# Patient Record
Sex: MALE | Race: WHITE | NOT HISPANIC OR LATINO | Employment: FULL TIME | URBAN - NONMETROPOLITAN AREA
[De-identification: names, ages, dates, MRNs, and addresses within clinical notes are randomized per-mention and may not be internally consistent; named-entity substitution may affect disease eponyms.]

---

## 2017-02-16 ENCOUNTER — OFFICE VISIT (OUTPATIENT)
Dept: PULMONOLOGY | Facility: CLINIC | Age: 27
End: 2017-02-16

## 2017-02-16 VITALS
DIASTOLIC BLOOD PRESSURE: 78 MMHG | HEART RATE: 65 BPM | OXYGEN SATURATION: 96 % | HEIGHT: 72 IN | RESPIRATION RATE: 18 BRPM | SYSTOLIC BLOOD PRESSURE: 126 MMHG | WEIGHT: 312 LBS | BODY MASS INDEX: 42.26 KG/M2

## 2017-02-16 DIAGNOSIS — J45.30 OCCUPATIONAL ASTHMA, MILD PERSISTENT, UNCOMPLICATED: ICD-10-CM

## 2017-02-16 DIAGNOSIS — R06.02 SHORTNESS OF BREATH: Primary | ICD-10-CM

## 2017-02-16 DIAGNOSIS — G47.33 OBSTRUCTIVE SLEEP APNEA: ICD-10-CM

## 2017-02-16 DIAGNOSIS — Z57.9 OCCUPATIONAL ASTHMA, MILD PERSISTENT, UNCOMPLICATED: ICD-10-CM

## 2017-02-16 PROCEDURE — 99244 OFF/OP CNSLTJ NEW/EST MOD 40: CPT | Performed by: INTERNAL MEDICINE

## 2017-02-16 PROCEDURE — 95012 NITRIC OXIDE EXP GAS DETER: CPT | Performed by: INTERNAL MEDICINE

## 2017-02-16 RX ORDER — FLUTICASONE PROPIONATE 44 MCG
AEROSOL WITH ADAPTER (GRAM) INHALATION
COMMUNITY
Start: 2017-01-05 | End: 2017-02-16

## 2017-02-16 RX ORDER — FEXOFENADINE HCL AND PSEUDOEPHEDRINE HCI 180; 240 MG/1; MG/1
1 TABLET, EXTENDED RELEASE ORAL DAILY
COMMUNITY
End: 2017-10-18

## 2017-02-16 RX ORDER — ALBUTEROL SULFATE 90 UG/1
2 AEROSOL, METERED RESPIRATORY (INHALATION) EVERY 4 HOURS PRN
Qty: 1 INHALER | Refills: 5 | Status: SHIPPED | OUTPATIENT
Start: 2017-02-16 | End: 2017-02-24

## 2017-02-16 SDOH — HEALTH STABILITY - PHYSICAL HEALTH: OCCUPATIONAL EXPOSURE TO UNSPECIFIED RISK FACTOR: Z57.9

## 2017-02-16 NOTE — PROGRESS NOTES
"CONSULT NOTE    Chief Complaint   Patient presents with   • Cough     CHRONIC       Subjective   Kenny August is a 27 y.o. male.     History of Present Illness   Patient comes in today for consultation because of shortness of breath.  The patient says that since he started his new job, 1 year ago, he has noticed that he gets short of breath and starts wheezing, especially 2-3 days after he starts working again.  The patient actually reports improvement in symptoms when he was off work for more than 3-4 days at a time.    He also notes occasional wheezing, which is more pronounced when he is at work?    The patient denies any known family history of asthma although his grandfather had lung cancer.  He also grew up in a household where his grandfather smoked heavily inside the house    The patient was diagnosed with obstructive sleep apnea with an AHI of 17/h and currently uses CPAP device at a pressure of 12 to good effect.    The following portions of the patient's history were reviewed and updated as appropriate: allergies, current medications, past family history, past medical history, past social history and past surgical history.    Review of Systems   HENT: Positive for sinus pressure.    Respiratory: Positive for cough, chest tightness and shortness of breath.    All other systems reviewed and are negative.      Past Medical History   Diagnosis Date   • Sleep apnea, obstructive        Social History   Substance Use Topics   • Smoking status: Never Smoker   • Smokeless tobacco: Not on file   • Alcohol use No         Objective   Visit Vitals   • /78   • Pulse 65   • Resp 18   • Ht 72\" (182.9 cm)   • Wt (!) 312 lb (142 kg)   • SpO2 96%   • BMI 42.31 kg/m2     Physical Exam    Constitutional:           General: No acute distress noted. Able to communicate appropriately           Body Habitus: Obese.    Head/Face/Eyes:            Atraumatic. Normocephalic. No significant facial abnormalities seen.            " Extra ocular movement was intact.            Pupils appeared equal            Conjunctivae: Normal     ENT:           Nasal passages were patent.           Mallempati III/IV. Crowded oropharynx.            Tonsils not enlarged.    Neck:           Increased adipose tissue noted. No Jugular Venous Distention appreciated.     Cardiovascular:            S1 + S2. Regular.    Respiratory:           Respiratory effort was adequate           No crackles or wheezing was auscultated.    Musculoskeletal/Extremities:             Normal Gait.              No clubbing, cyanosis noted in the upper extremities.             No edema noted in the lower extremities bilaterally.    Neurologic/Psychiatric:             Affect appeared fair.                Awake, alert and oriented x 3.             Able to follow simple commands.    Skin:             No obvious rash noted.             Warm and dry.      Assessment/Plan   Kenny was seen today for cough.    Diagnoses and all orders for this visit:    Shortness of breath  -     Nitric Oxide    Occupational asthma, mild persistent, uncomplicated  -     Nitric Oxide    Obstructive sleep apnea    Other orders  -     mometasone-formoterol (DULERA 200) 200-5 MCG/ACT inhaler; Inhale 2 puffs 2 (Two) Times a Day. Rinse mouth with water after use.  -     albuterol (VENTOLIN HFA) 108 (90 BASE) MCG/ACT inhaler; Inhale 2 puffs Every 4 (Four) Hours As Needed for wheezing or shortness of air.           Return in about 2 months (around 4/16/2017) for Recheck.    DISCUSSION(if any):  I have reviewed the patient's imaging studies and shared them with him.  The CT scan does not show any acute intrathoracic abnormality.     I have also reviewed his last primary care provider's office note that mentions chronic cough and referral to pulmonology.  It also mentions suggestion of mild restriction on the PFTs.     ==============================  ==============================    I had a very long discussion with  the patient and told him that he clearly has symptoms of occupational asthma.  Since he seems to have at least mild to moderate persistent asthma at this time, I decided to start him on Dulera    Patient was advised to use albuterol based rescue inhaler for when necessary purposes    Patient was also advised to keep a log of the use of rescue inhaler.    FeNO levels were 13.    We will consider PFTs, upon follow-up visit mostly based on symptoms.      Errors in dictation may reflect use of voice recognition software and not all errors in transcription may have been detected prior to signing    This document was electronically signed by Sho Sandhu MD February 16, 2017  10:06 AM

## 2017-04-17 ENCOUNTER — OFFICE VISIT (OUTPATIENT)
Dept: PULMONOLOGY | Facility: CLINIC | Age: 27
End: 2017-04-17

## 2017-04-17 VITALS
WEIGHT: 308.6 LBS | SYSTOLIC BLOOD PRESSURE: 118 MMHG | HEIGHT: 72 IN | OXYGEN SATURATION: 98 % | RESPIRATION RATE: 16 BRPM | BODY MASS INDEX: 41.8 KG/M2 | HEART RATE: 75 BPM | DIASTOLIC BLOOD PRESSURE: 80 MMHG

## 2017-04-17 DIAGNOSIS — G47.33 OBSTRUCTIVE SLEEP APNEA: ICD-10-CM

## 2017-04-17 DIAGNOSIS — R06.02 SHORTNESS OF BREATH: Primary | ICD-10-CM

## 2017-04-17 DIAGNOSIS — Z57.9 OCCUPATIONAL ASTHMA, MILD PERSISTENT, UNCOMPLICATED: ICD-10-CM

## 2017-04-17 DIAGNOSIS — J45.30 OCCUPATIONAL ASTHMA, MILD PERSISTENT, UNCOMPLICATED: ICD-10-CM

## 2017-04-17 PROCEDURE — 99213 OFFICE O/P EST LOW 20 MIN: CPT | Performed by: INTERNAL MEDICINE

## 2017-04-17 RX ORDER — MONTELUKAST SODIUM 10 MG/1
10 TABLET ORAL NIGHTLY
Qty: 30 TABLET | Refills: 5 | Status: SHIPPED | OUTPATIENT
Start: 2017-04-17 | End: 2017-10-18 | Stop reason: SDUPTHER

## 2017-04-17 SDOH — HEALTH STABILITY - PHYSICAL HEALTH: OCCUPATIONAL EXPOSURE TO UNSPECIFIED RISK FACTOR: Z57.9

## 2017-04-17 NOTE — PROGRESS NOTES
"Chief Complaint   Patient presents with   • Follow-up         Subjective   Kenny August is a 27 y.o. male.     History of Present Illness   Comes in today to follow-up on asthma and shortness of breath    He feels that his symptoms have definitely improved since he started using Dulera.  He still requires pro-air once or twice a day, when he is working.    He feels that he does not need the pro-air at all, on the days when he is not at work.    The following portions of the patient's history were reviewed and updated as appropriate: allergies, current medications, past family history, past medical history, past social history and past surgical history.    Review of Systems   HENT: Positive for sinus pressure. Negative for sneezing and sore throat.    Respiratory: Positive for shortness of breath. Negative for cough and wheezing.        Objective   Visit Vitals   • /80   • Pulse 75   • Resp 16   • Ht 72\" (182.9 cm)   • Wt (!) 308 lb 9.6 oz (140 kg)   • SpO2 98%   • BMI 41.85 kg/m2       Physical Exam   Constitutional: He is oriented to person, place, and time. He appears well-developed and well-nourished.   HENT:   Head: Atraumatic.   Crowded oropharynx.   Pulmonary/Chest: Effort normal and breath sounds normal. He has no wheezes.   Musculoskeletal:   Gait normal.   Neurological: He is alert and oriented to person, place, and time.   Psychiatric: He has a normal mood and affect.   Vitals reviewed.      Assessment/Plan   Kenny was seen today for follow-up.    Diagnoses and all orders for this visit:    Shortness of breath    Occupational asthma, mild persistent, uncomplicated    Obstructive sleep apnea    Other orders  -     montelukast (SINGULAIR) 10 MG tablet; Take 1 tablet by mouth Every Night.  -     mometasone-formoterol (DULERA 200) 200-5 MCG/ACT inhaler; Inhale 2 puffs 2 (Two) Times a Day. Rinse mouth with water after use.         Return in about 6 months (around 10/17/2017) for Recheck.    DISCUSSION " (if any):  The patient continues to manifest symptoms of occupational asthma.  Since she had a very good response to Dulera, I have asked him to continue the same.    Singulair added.    He was advised to continue CPAP, which is being managed by his ENT provider    Errors in dictation may reflect use of voice recognition software and not all errors in transcription may have been detected prior to signing    This document was electronically signed by Sho Sandhu MD April 17, 2017  3:22 PM

## 2017-10-18 ENCOUNTER — OFFICE VISIT (OUTPATIENT)
Dept: PULMONOLOGY | Facility: CLINIC | Age: 27
End: 2017-10-18

## 2017-10-18 VITALS
WEIGHT: 278 LBS | BODY MASS INDEX: 37.65 KG/M2 | HEIGHT: 72 IN | SYSTOLIC BLOOD PRESSURE: 115 MMHG | HEART RATE: 75 BPM | OXYGEN SATURATION: 98 % | RESPIRATION RATE: 16 BRPM | DIASTOLIC BLOOD PRESSURE: 72 MMHG

## 2017-10-18 DIAGNOSIS — J45.40 MODERATE PERSISTENT ASTHMA WITHOUT COMPLICATION: ICD-10-CM

## 2017-10-18 DIAGNOSIS — J30.89 OTHER ALLERGIC RHINITIS: ICD-10-CM

## 2017-10-18 DIAGNOSIS — G47.33 OBSTRUCTIVE SLEEP APNEA: Primary | ICD-10-CM

## 2017-10-18 DIAGNOSIS — R06.02 SHORTNESS OF BREATH: ICD-10-CM

## 2017-10-18 PROCEDURE — 99214 OFFICE O/P EST MOD 30 MIN: CPT | Performed by: INTERNAL MEDICINE

## 2017-10-18 RX ORDER — PEN NEEDLE, DIABETIC 31 GX5/16"
NEEDLE, DISPOSABLE MISCELLANEOUS
COMMUNITY
Start: 2017-08-25 | End: 2019-01-24

## 2017-10-18 RX ORDER — MONTELUKAST SODIUM 10 MG/1
10 TABLET ORAL NIGHTLY
Qty: 30 TABLET | Refills: 5 | Status: SHIPPED | OUTPATIENT
Start: 2017-10-18 | End: 2018-01-25 | Stop reason: SDUPTHER

## 2017-10-18 RX ORDER — AZELASTINE 1 MG/ML
1 SPRAY, METERED NASAL EVERY 12 HOURS PRN
Qty: 1 EACH | Refills: 5 | Status: SHIPPED | OUTPATIENT
Start: 2017-10-18 | End: 2018-09-24 | Stop reason: SDUPTHER

## 2017-10-18 RX ORDER — FEXOFENADINE HCL 180 MG/1
TABLET ORAL
COMMUNITY
Start: 2017-10-16 | End: 2018-04-23

## 2017-10-18 RX ORDER — AZELASTINE 1 MG/ML
SPRAY, METERED NASAL
COMMUNITY
Start: 2017-09-25 | End: 2017-10-18 | Stop reason: SDUPTHER

## 2017-10-18 RX ORDER — LIRAGLUTIDE 6 MG/ML
INJECTION, SOLUTION SUBCUTANEOUS
COMMUNITY
Start: 2017-08-28 | End: 2017-10-18

## 2017-10-18 RX ORDER — FLUTICASONE PROPIONATE 50 MCG
SPRAY, SUSPENSION (ML) NASAL
COMMUNITY
Start: 2017-09-25 | End: 2018-09-24

## 2017-10-18 RX ORDER — CEFDINIR 300 MG/1
CAPSULE ORAL
COMMUNITY
Start: 2017-10-15 | End: 2019-01-24

## 2017-10-18 NOTE — PROGRESS NOTES
"Chief Complaint   Patient presents with   • Follow-up   • Shortness of Breath         Subjective   Kenny August is a 27 y.o. male.     History of Present Illness   Patient comes in today to follow-up on shortness of breath, asthma, allergic rhinitis and obstructive sleep apnea.    The patient reports one recent episode of sinus infection but overall his symptoms have remained under decent control.  He does have occasional runny nose and dribbling in the back of his throat.  He says that if he uses the Flonase on a regular basis, he starts having foul-smelling nasal discharge and feels that it may be causing the \"sinusitis\".    Patient doesn't report any issues with the mask, which is a full face mask.  He is having issues with excessive dryness, mostly around 4 AM in the morning.  He says that his reservoir is empty at that time and his mouth gets excessively dry which usually wakes him up.    Patient's symptoms of sleep disturbance/daytime sleepiness have been helped greatly with the use of PAP at a pressure of 12/20, as prescribed.    He actually went on a hunting trip for 3 nights and could not take his CPAP device with them and felt \"worse\" without it.    The following portions of the patient's history were reviewed and updated as appropriate: allergies, current medications, past family history, past medical history and past social history.    Review of Systems   HENT: Positive for sinus pressure, sneezing and sore throat.    Respiratory: Positive for cough and shortness of breath. Negative for wheezing.        Objective   Visit Vitals   • /72   • Pulse 75   • Resp 16   • Ht 72\" (182.9 cm)   • Wt 278 lb (126 kg)   • SpO2 98%   • BMI 37.7 kg/m2       Physical Exam   Constitutional: He is oriented to person, place, and time. He appears well-developed and well-nourished.   HENT:   Head: Atraumatic.   Sinuses were non tender on palpation.  Nostril showed mild erythema.  Oropharynx was cobblestoned & crowded. "   Cardiovascular: Normal rate and regular rhythm.    Pulmonary/Chest: Effort normal and breath sounds normal. No respiratory distress. He has no wheezes. He has no rales.   Musculoskeletal:   Gait was normal.   Neurological: He is alert and oriented to person, place, and time.   Psychiatric: He has a normal mood and affect.   Vitals reviewed.      Assessment/Plan   Kenny was seen today for follow-up and shortness of breath.    Diagnoses and all orders for this visit:    Obstructive sleep apnea  -     BIPAP / CPAP Adjustment    Moderate persistent asthma without complication    Shortness of breath    Other allergic rhinitis    Other orders  -     azelastine (ASTELIN) 0.1 % nasal spray; 1 spray into each nostril Every 12 (Twelve) Hours As Needed for Rhinitis or Allergies.  -     Fluticasone Furoate-Vilanterol (BREO ELLIPTA) 200-25 MCG/INH aerosol powder ; Inhale 1 puff Daily. Rinse mouth with water after use.  -     montelukast (SINGULAIR) 10 MG tablet; Take 1 tablet by mouth Every Night.  -     PROAIR  (90 Base) MCG/ACT inhaler; Inhale 2 puffs Every 6 (Six) Hours As Needed for Wheezing or Shortness of Air.           Return in about 6 months (around 4/18/2018) for Recheck, For Charisse.    DISCUSSION (if any):  Since his symptoms of asthma have remained under decent control, I will switch him to Brio, mostly for insurance coverage reasons.  He will not be on Dulera any more.    Patient was advised to use albuterol based rescue inhaler for when necessary purposes    Patient was also advised to keep a log of the use of rescue inhaler.    I have asked him to use Flonase on a seasonal basis and prescribed azelastine to be used on a when necessary basis.    As far as excessive dryness is concerned, I will prescribe heated tubing, as he seems to be having issues with the humidification setup and despite the fact he is using the humidifier at 4, his reservoir is empty in the morning and he is usually excessively dry  which makes it difficult for him to use the mask be on 4 AM or so.      Dictated utilizing Dragon dictation.    This document was electronically signed by Sho Sandhu MD October 18, 2017  2:36 PM

## 2018-01-25 RX ORDER — MONTELUKAST SODIUM 10 MG/1
10 TABLET ORAL NIGHTLY
Qty: 30 TABLET | Refills: 5 | Status: SHIPPED | OUTPATIENT
Start: 2018-01-25 | End: 2018-04-23 | Stop reason: SDUPTHER

## 2018-04-23 ENCOUNTER — LAB (OUTPATIENT)
Dept: LAB | Facility: HOSPITAL | Age: 28
End: 2018-04-23

## 2018-04-23 ENCOUNTER — OFFICE VISIT (OUTPATIENT)
Dept: PULMONOLOGY | Facility: CLINIC | Age: 28
End: 2018-04-23

## 2018-04-23 VITALS
BODY MASS INDEX: 41.28 KG/M2 | HEART RATE: 67 BPM | WEIGHT: 304.8 LBS | SYSTOLIC BLOOD PRESSURE: 130 MMHG | HEIGHT: 72 IN | RESPIRATION RATE: 16 BRPM | OXYGEN SATURATION: 97 % | DIASTOLIC BLOOD PRESSURE: 80 MMHG

## 2018-04-23 DIAGNOSIS — J45.40 MODERATE PERSISTENT ASTHMA WITHOUT COMPLICATION: ICD-10-CM

## 2018-04-23 DIAGNOSIS — J30.89 OTHER ALLERGIC RHINITIS: ICD-10-CM

## 2018-04-23 DIAGNOSIS — R06.02 SHORTNESS OF BREATH: ICD-10-CM

## 2018-04-23 DIAGNOSIS — G47.33 OBSTRUCTIVE SLEEP APNEA: Primary | ICD-10-CM

## 2018-04-23 PROCEDURE — 86003 ALLG SPEC IGE CRUDE XTRC EA: CPT

## 2018-04-23 PROCEDURE — 82785 ASSAY OF IGE: CPT

## 2018-04-23 PROCEDURE — 99214 OFFICE O/P EST MOD 30 MIN: CPT | Performed by: NURSE PRACTITIONER

## 2018-04-23 PROCEDURE — 36415 COLL VENOUS BLD VENIPUNCTURE: CPT

## 2018-04-23 RX ORDER — MONTELUKAST SODIUM 10 MG/1
10 TABLET ORAL NIGHTLY
Qty: 30 TABLET | Refills: 5 | Status: CANCELLED | OUTPATIENT
Start: 2018-04-23

## 2018-04-23 RX ORDER — FEXOFENADINE HCL AND PSEUDOEPHEDRINE HCI 180; 240 MG/1; MG/1
1 TABLET, EXTENDED RELEASE ORAL DAILY
Qty: 90 TABLET | Refills: 0 | Status: SHIPPED | OUTPATIENT
Start: 2018-04-23 | End: 2019-03-05 | Stop reason: SDUPTHER

## 2018-04-23 RX ORDER — FEXOFENADINE HCL 180 MG/1
TABLET ORAL
Status: CANCELLED | OUTPATIENT
Start: 2018-04-23

## 2018-04-23 RX ORDER — MONTELUKAST SODIUM 10 MG/1
10 TABLET ORAL NIGHTLY
Qty: 30 TABLET | Refills: 5 | Status: SHIPPED | OUTPATIENT
Start: 2018-04-23 | End: 2018-09-24 | Stop reason: SDUPTHER

## 2018-04-28 LAB — TOTAL IGE SMQN RAST: 68 IU/ML (ref 0–100)

## 2018-04-29 LAB
A ALTERNATA IGE QN: <0.1 KU/L
A FUMIGATUS IGE QN: <0.1 KU/L
AMER ROACH IGE QN: <0.1 KU/L
BAHIA GRASS IGE QN: <0.1 KU/L
BERMUDA GRASS IGE QN: <0.1 KU/L
BOXELDER IGE QN: <0.1 KU/L
C HERBARUM IGE QN: <0.1 KU/L
CAT DANDER IGG QN: <0.1 KU/L
CMN PIGWEED IGE QN: <0.1 KU/L
COMMON RAGWEED IGE QN: 0.18 KU/L
CONV CLASS DESCRIPTION: ABNORMAL
D FARINAE IGE QN: 1.32 KU/L
D PTERONYSS IGE QN: 1.28 KU/L
DOG DANDER IGE QN: <0.1 KU/L
ENGL PLANTAIN IGE QN: <0.1 KU/L
HAZELNUT POLN IGE QN: <0.1 KU/L
JOHNSON GRASS IGE QN: <0.1 KU/L
KENT BLUE GRASS IGE QN: <0.1 KU/L
M RACEMOSUS IGE QN: <0.1 KU/L
MT JUNIPER IGE QN: 0.16 KU/L
MUGWORT IGE QN: <0.1 KU/L
NETTLE IGE QN: <0.1 KU/L
P NOTATUM IGE QN: <0.1 KU/L
S BOTRYOSUM IGE QN: <0.1 KU/L
SHEEP SORREL IGE QN: <0.1 KU/L
SWEET GUM IGE QN: <0.1 KU/L
T011-IGE MAPLE LEAF SYCAMORE: <0.1 KU/L
WHITE ELM IGE QN: <0.1 KU/L
WHITE HICKORY IGE QN: <0.1 KU/L
WHITE MULBERRY IGE QN: <0.1 KU/L
WHITE OAK IGE QN: <0.1 KU/L

## 2018-09-24 ENCOUNTER — OFFICE VISIT (OUTPATIENT)
Dept: PULMONOLOGY | Facility: CLINIC | Age: 28
End: 2018-09-24

## 2018-09-24 VITALS
HEART RATE: 78 BPM | DIASTOLIC BLOOD PRESSURE: 82 MMHG | WEIGHT: 315 LBS | OXYGEN SATURATION: 98 % | BODY MASS INDEX: 42.66 KG/M2 | SYSTOLIC BLOOD PRESSURE: 138 MMHG | HEIGHT: 72 IN | RESPIRATION RATE: 16 BRPM

## 2018-09-24 DIAGNOSIS — R06.02 SHORTNESS OF BREATH: ICD-10-CM

## 2018-09-24 DIAGNOSIS — J45.40 MODERATE PERSISTENT ASTHMA WITHOUT COMPLICATION: ICD-10-CM

## 2018-09-24 DIAGNOSIS — J30.89 OTHER ALLERGIC RHINITIS: ICD-10-CM

## 2018-09-24 DIAGNOSIS — G47.33 OBSTRUCTIVE SLEEP APNEA: Primary | ICD-10-CM

## 2018-09-24 PROCEDURE — 94618 PULMONARY STRESS TESTING: CPT | Performed by: INTERNAL MEDICINE

## 2018-09-24 PROCEDURE — 95012 NITRIC OXIDE EXP GAS DETER: CPT | Performed by: INTERNAL MEDICINE

## 2018-09-24 PROCEDURE — 99214 OFFICE O/P EST MOD 30 MIN: CPT | Performed by: INTERNAL MEDICINE

## 2018-09-24 RX ORDER — MONTELUKAST SODIUM 10 MG/1
10 TABLET ORAL NIGHTLY
Qty: 30 TABLET | Refills: 5 | Status: SHIPPED | OUTPATIENT
Start: 2018-09-24 | End: 2020-09-27

## 2018-09-24 RX ORDER — AZELASTINE 1 MG/ML
1 SPRAY, METERED NASAL EVERY 12 HOURS PRN
Qty: 1 EACH | Refills: 5 | OUTPATIENT
Start: 2018-09-24 | End: 2019-01-24

## 2018-09-24 RX ORDER — BUDESONIDE AND FORMOTEROL FUMARATE DIHYDRATE 80; 4.5 UG/1; UG/1
2 AEROSOL RESPIRATORY (INHALATION)
Qty: 1 INHALER | Refills: 5 | Status: SHIPPED | OUTPATIENT
Start: 2018-09-24 | End: 2019-03-05

## 2018-09-24 NOTE — PROGRESS NOTES
"Chief Complaint   Patient presents with   • Follow-up   • Sleep Apnea       Subjective   Kenny August is a 28 y.o. male.     History of Present Illness   Patient comes today for follow up of shortness of breath, sleep apnea, and asthma.     Symptoms have been stable since the last clinic visit. Patient reports no recent exacerbations.     Patient is not using Brio for the past 1 month or so.  He requires the pro-air 4-5 times a week.      He is using nasal spray on an as-needed basis and reports no significant issues with runny nose etc.    The patient also has sleep apnea and is using the AutoPAP at a pressure of 12/20.  Although he is very compliant overall, he is mentioned multiple nights where he takes the mask off in his sleep.    Patient says that the compliance with the use of the equipment is good.     Patient's symptoms of sleep disturbance & daytime sleepiness have been helped greatly.     The patient is having good control of her symptoms of allergic rhinitis with regular use of Astelin that she usually needs only 3-4 times a week.    The following portions of the patient's history were reviewed and updated as appropriate: allergies, current medications, past family history, past medical history, past social history and past surgical history.    Review of Systems   HENT: Positive for sinus pressure. Negative for sneezing and sore throat.    Respiratory: Negative for cough, shortness of breath and wheezing.        Objective   Visit Vitals  /82   Pulse 78   Resp 16   Ht 182.9 cm (72.01\")   Wt (!) 143 kg (315 lb)   SpO2 98%   BMI 42.71 kg/m²       Physical Exam   Constitutional: He is oriented to person, place, and time. He appears well-developed and well-nourished.   HENT:   Head: Atraumatic.   Nostril showed mild erythema.  Oropharynx was cobblestoned & crowded.   Neck: Normal range of motion. Neck supple. No JVD present. No thyromegaly present.   Cardiovascular: Normal rate and regular rhythm.  "   Pulmonary/Chest: Effort normal and breath sounds normal. No respiratory distress. He has no wheezes. He has no rales.   Musculoskeletal:   Gait was normal.   Neurological: He is alert and oriented to person, place, and time.   Psychiatric: He has a normal mood and affect.   Vitals reviewed.      Assessment/Plan   Kenny was seen today for follow-up and sleep apnea.    Diagnoses and all orders for this visit:    Obstructive sleep apnea    Moderate persistent asthma without complication  -     Nitric Oxide  -     Peak Flow    Other allergic rhinitis    Shortness of breath    Other orders  -     azelastine (ASTELIN) 0.1 % nasal spray; 1 spray into the nostril(s) as directed by provider Every 12 (Twelve) Hours As Needed for Rhinitis or Allergies.  -     montelukast (SINGULAIR) 10 MG tablet; Take 1 tablet by mouth Every Night.  -     PROAIR  (90 Base) MCG/ACT inhaler; Inhale 2 puffs Every 6 (Six) Hours As Needed for Wheezing or Shortness of Air.  -     budesonide-formoterol (SYMBICORT) 80-4.5 MCG/ACT inhaler; Inhale 2 puffs 2 (Two) Times a Day. Rinse mouth with water after use.           Return in about 5 months (around 2/24/2019) for Recheck, [facility].    DISCUSSION (if any):  Peak flow was 470 LPM today.    FeNO level was 18 today.      I've had a very long discussion with the patient and told him that his symptoms continue to point towards work exacerbated asthma which seems to be under better control however it continues to be mild persistent.    Due to his use of rescue inhaler at least 4-5 times a week, I believe he will need to stay on long-term medication and some form and have prescribed Symbicort for regular use.    I also reviewed the recent trial that showed effectiveness and safety of Symbicort if used appropriately for when necessary purposes.    I went over indications to use Symbicort on when necessary basis.    Told the patient that he needs a pro-air more than 3-4 times a week, then he should  use Symbicort regularly rather than when necessary.    Compliance with medications stressed.     Side effects of prescribed medications discussed with the patient    I've also asked him to start using nasal spray at night, as occasionally nasal congestion seems to worsen his symptoms.    As far as sleep apnea is concerned, I have asked him to try to use the mask during the daytime, trying to get used to it.    Continue treatment with AutoPap at 12/20.    Patient's compliance data was reviewed and the compliance is greater than 70%    Asked to have DME service the equipment atleast once every 3-6 months or so.    Use every night for atleast 4 hours stressed.    I've asked him to continue using azelastine for when necessary use          Dictated utilizing Dragon dictation.    This document was electronically signed by Sho Sandhu MD September 24, 2018  3:22 PM

## 2019-03-05 ENCOUNTER — OFFICE VISIT (OUTPATIENT)
Dept: PULMONOLOGY | Facility: CLINIC | Age: 29
End: 2019-03-05

## 2019-03-05 VITALS
BODY MASS INDEX: 42.66 KG/M2 | SYSTOLIC BLOOD PRESSURE: 120 MMHG | HEART RATE: 98 BPM | RESPIRATION RATE: 18 BRPM | WEIGHT: 315 LBS | OXYGEN SATURATION: 95 % | HEIGHT: 72 IN | DIASTOLIC BLOOD PRESSURE: 82 MMHG

## 2019-03-05 DIAGNOSIS — E66.01 MORBID OBESITY, UNSPECIFIED OBESITY TYPE (HCC): ICD-10-CM

## 2019-03-05 DIAGNOSIS — R06.02 SHORTNESS OF BREATH: ICD-10-CM

## 2019-03-05 DIAGNOSIS — J45.40 MODERATE PERSISTENT ASTHMA WITHOUT COMPLICATION: ICD-10-CM

## 2019-03-05 DIAGNOSIS — J30.89 OTHER ALLERGIC RHINITIS: ICD-10-CM

## 2019-03-05 DIAGNOSIS — G47.33 OBSTRUCTIVE SLEEP APNEA: Primary | ICD-10-CM

## 2019-03-05 PROCEDURE — 95012 NITRIC OXIDE EXP GAS DETER: CPT | Performed by: INTERNAL MEDICINE

## 2019-03-05 PROCEDURE — 99214 OFFICE O/P EST MOD 30 MIN: CPT | Performed by: INTERNAL MEDICINE

## 2019-03-05 RX ORDER — ALBUTEROL SULFATE 90 UG/1
AEROSOL, METERED RESPIRATORY (INHALATION)
COMMUNITY
End: 2020-09-27

## 2019-03-05 RX ORDER — FLUTICASONE PROPIONATE 50 MCG
SPRAY, SUSPENSION (ML) NASAL
COMMUNITY
Start: 2011-04-16 | End: 2019-05-20

## 2019-03-05 RX ORDER — AZELASTINE HYDROCHLORIDE 137 UG/1
SPRAY, METERED NASAL
COMMUNITY
End: 2020-09-27

## 2019-03-05 RX ORDER — FEXOFENADINE HCL AND PSEUDOEPHEDRINE HCI 180; 240 MG/1; MG/1
1 TABLET, EXTENDED RELEASE ORAL DAILY
Qty: 30 TABLET | Refills: 2 | Status: SHIPPED | OUTPATIENT
Start: 2019-03-05

## 2019-03-05 RX ORDER — BUDESONIDE AND FORMOTEROL FUMARATE DIHYDRATE 80; 4.5 UG/1; UG/1
AEROSOL RESPIRATORY (INHALATION)
COMMUNITY
End: 2020-09-27

## 2019-03-05 NOTE — PROGRESS NOTES
"Chief Complaint   Patient presents with   • Follow-up   • Sleeping Problem         Subjective   Kenny August is a 29 y.o. male.     History of Present Illness   Patient comes in today for follow up of asthma, allergic rhinitis, LAMBERT and shortness of breath. Patient does not report any recent exacerbations requiring emergency room visits or hospitalizations.    Patient is using the rescue inhalers minimally. he is compliant with pulmonary medicines, as prescribed.    Patient says that he has been using his nasal sprays on a regular basis and describes no significant ongoing issues other than occasional congestion.     Patient doesn't report any issues with the device or mask.     Patient says that the compliance with the use of the equipment is good.     Patient says that his symptoms of sleep disturbance & daytime sleepiness have been helped greatly with the use of PAP, as prescribed.       The following portions of the patient's history were reviewed and updated as appropriate: allergies, current medications, past family history, past medical history, past social history and past surgical history.    Review of Systems   Constitutional: Negative for chills and fever.   HENT: Positive for sinus pressure and sore throat. Negative for rhinorrhea.    Respiratory: Negative for cough and shortness of breath.    Psychiatric/Behavioral: Positive for sleep disturbance.       Objective   Visit Vitals  /82   Pulse 98   Resp 18   Ht 182.9 cm (72.01\")   Wt (!) 148 kg (327 lb)   SpO2 95%   BMI 44.34 kg/m²       Physical Exam   Constitutional: He is oriented to person, place, and time. He appears well-developed and well-nourished.   HENT:   Head: Atraumatic.   Crowded oropharynx.    Eyes: EOM are normal.   Neck: Neck supple.   Cardiovascular: Normal rate and regular rhythm.   Pulmonary/Chest: Effort normal. No respiratory distress.   Normal to percussion  Good A/E with out wheezing noted.   Musculoskeletal: He exhibits no " edema.   Gait was normal.   Neurological: He is alert and oriented to person, place, and time.   Skin: Skin is warm and dry.   Psychiatric: He has a normal mood and affect.   Vitals reviewed.        Assessment/Plan   Kenny was seen today for follow-up and sleeping problem.    Diagnoses and all orders for this visit:    Obstructive sleep apnea  -     BIPAP / CPAP Adjustment    Moderate persistent asthma without complication  -     Nitric Oxide  -     Peak Flow    Other allergic rhinitis    Shortness of breath    Morbid obesity, unspecified obesity type (CMS/HCC)    Other orders  -     fexofenadine-pseudoephedrine (ALLEGRA-D ALLERGY & CONGESTION) 180-240 MG per 24 hr tablet; Take 1 tablet by mouth Daily.         Return in about 5 months (around 8/5/2019) for Recheck.    DISCUSSION (if any):  FeNO level was 11 today.    Peak flow was 550 LPM today.    Patient's symptoms seem to be under adequate control with the current regimen.    Patient's medications for underlying asthma were reviewed in great detail.    Any needed adjustments to his pulmonary medications, either for clinical or insurance coverage reasons, have been made and are reflected in the orders.    Compliance with medications stressed.     Side effects of prescribed medications discussed with the patient.    Patient was advised to continue his nasal spray, especially given improvement in symptoms overall.    The patient was asked to call this office if the symptoms worsen.    Continue treatment with AutoPAP at a pressure of 12/20, with a full-face mask.    Patient seems to be compliant with PAP device, based on the available data and his account of improved symptoms.     Weight loss advised.    The patient was once again reminded to continue using the PAP device regularly, every night for atleast 4 hours.        Dictated utilizing Dragon dictation.    This document was electronically signed by Sho Sandhu MD on 03/05/19 at 4:38 PM

## 2019-09-17 RX ORDER — MONTELUKAST SODIUM 10 MG/1
TABLET ORAL
Qty: 30 TABLET | Refills: 0 | OUTPATIENT
Start: 2019-09-17

## 2020-01-29 ENCOUNTER — OFFICE VISIT (OUTPATIENT)
Dept: ORTHOPEDIC SURGERY | Facility: CLINIC | Age: 30
End: 2020-01-29

## 2020-01-29 VITALS — HEIGHT: 72 IN | HEART RATE: 114 BPM | BODY MASS INDEX: 42.26 KG/M2 | WEIGHT: 312 LBS | OXYGEN SATURATION: 98 %

## 2020-01-29 DIAGNOSIS — M25.561 RIGHT KNEE PAIN, UNSPECIFIED CHRONICITY: Primary | ICD-10-CM

## 2020-01-29 DIAGNOSIS — M94.261 CHONDROMALACIA OF RIGHT KNEE: ICD-10-CM

## 2020-01-29 PROCEDURE — 99203 OFFICE O/P NEW LOW 30 MIN: CPT | Performed by: ORTHOPAEDIC SURGERY

## 2020-01-29 NOTE — PROGRESS NOTES
[facility] Note    Subjective     Chief Complaint   Patient presents with   • Right Knee - Pain        HPI      Kenny August is a 30 y.o. male who presents with right knee pain.  The issue has been ongoing for 3 week(s). Pain is a 8/10 on the pain scale. Pain is described as burning and stabbing. Associated symptoms include pain, swelling, popping, grinding, stiffness and giving way/buckling. The pain is worse with walking, climbing stairs, working and leisure; resting improve the pain. Previous treatments have included: bracing.    I have reviewed history documented by others and confirm that is accurate.    Patient fell on his knee 3 weeks ago and then couple days later started having increasing pain.  No previous knee injury or problem.  Ju sinclair sent him to me.      Past Medical History:   Diagnosis Date   • Sleep apnea, obstructive       Past Surgical History:   Procedure Laterality Date   • APPENDECTOMY     • NASAL SEPTUM SURGERY        Family History   Problem Relation Age of Onset   • No Known Problems Mother    • No Known Problems Father      Social History     Socioeconomic History   • Marital status:      Spouse name: Not on file   • Number of children: Not on file   • Years of education: Not on file   • Highest education level: Not on file   Tobacco Use   • Smoking status: Never Smoker   • Smokeless tobacco: Never Used   Substance and Sexual Activity   • Alcohol use: No   • Drug use: No      Current Outpatient Medications on File Prior to Visit   Medication Sig Dispense Refill   • albuterol sulfate HFA (PROAIR HFA) 108 (90 Base) MCG/ACT inhaler ProAir HFA 90 mcg/actuation aerosol inhaler   Inhale 2 puffs every 4-6 hours by inhalation route as needed for 30 days.     • Azelastine HCl 137 MCG/SPRAY solution azelastine 137 mcg (0.1 %) nasal spray aerosol     • budesonide-formoterol (SYMBICORT) 80-4.5 MCG/ACT inhaler Symbicort 80 mcg-4.5 mcg/actuation HFA aerosol  "inhaler     • fexofenadine-pseudoephedrine (ALLEGRA-D ALLERGY & CONGESTION) 180-240 MG per 24 hr tablet Take 1 tablet by mouth Daily. 30 tablet 2   • fluticasone (FLONASE) 50 MCG/ACT nasal spray 1-2 sprays each nostril daily 1 bottle 0   • montelukast (SINGULAIR) 10 MG tablet Take 1 tablet by mouth Every Night. 30 tablet 5   • omeprazole (priLOSEC) 40 MG capsule Take 40 mg by mouth Daily.     • PROAIR  (90 Base) MCG/ACT inhaler Inhale 2 puffs Every 6 (Six) Hours As Needed for Wheezing or Shortness of Air. 1 inhaler 5   • [DISCONTINUED] doxycycline (MONODOX) 100 MG capsule 1 po bid 20 capsule 0   • [DISCONTINUED] meloxicam (MOBIC) 7.5 MG tablet Take 1 tablet by mouth Daily. 7 tablet 0   • [DISCONTINUED] methylPREDNISolone (MEDROL, NAKUL,) 4 MG tablet Take as directed on package instructions. 21 tablet 0     No current facility-administered medications on file prior to visit.       Allergies   Allergen Reactions   • Augmentin [Amoxicillin-Pot Clavulanate] Hives        The following portions of the patient's history were reviewed and updated as appropriate: allergies, current medications, past family history, past medical history, past social history, past surgical history and problem list.    Review of Systems   Constitutional: Negative.    HENT: Negative.    Eyes: Negative.    Respiratory: Negative.    Cardiovascular: Negative.    Gastrointestinal: Negative.    Endocrine: Negative.    Genitourinary: Negative.    Musculoskeletal: Positive for arthralgias and joint swelling.   Skin: Negative.    Allergic/Immunologic: Negative.    Neurological: Negative.    Hematological: Negative.    Psychiatric/Behavioral: Negative.         Objective      Physical Exam  Pulse 114   Ht 182.9 cm (72.01\")   Wt (!) 142 kg (312 lb)   SpO2 98%   BMI 42.31 kg/m²     Body mass index is 42.31 kg/m².    GENERAL APPEARANCE: awake, alert & oriented x 3, in no acute distress and well developed, well nourished  PSYCH: normal mood and " affect  LUNGS:  breathing nonlabored, no wheezing  EYES: sclera anicteric, pupils equal  CARDIOVASCULAR: palpable pulses dorsalis pedis, palpable posterior tibial bilaterally. Capillary refill less than 2 seconds  INTEGUMENTARY: skin intact, no clubbing, cyanosis  NEUROLOGIC:  Normal Sensation and reflexes       Ortho Exam  Peripheral Vascular:    Upper Extremity:   Inspection:  Left--no cyanotic nail beds Right--no cyanotic nail beds   Bilateral:  Pink nail beds with brisk capillary refill   Palpation:  Bilateral radial pulse normal  Musculoskeletal:  Global Assessment:  Overall assessment of Lower Extremity Muscle Strength and Tone:  Right quadriceps--5/5  Right hamstrings--5/5  Right tibialis anterior--5/5  Right gastroc soleus--5/5  Right EHL--5/5  Lower Extremity:  Knee/Patella:  No digital clubbing or cyanosis.    Examination of right knee reveals:  Normal deep tendon reflexes, coordination, strength, tone, sensation.  No known fractures or deformities.  Inspection and Palpation:    Right knee:  Tenderness:  none  Effusion:  none  Crepitus:  none  Pulses:  2+  Ecchymosis:  None  Warmth:  None   ROM:  Right:  Extension:0    Flexion:135  Left:  Extension:0     Flexion:135  Instability:  Right:  Lachman Test:  Negative, Varus stress test negative,   Valgus stress test negative, Posterior Drawer Test:  Negative  Deformities/Malalignments/Discrepancies:    Left:  none  Right:  none  Functional Testing:  Right:  Ayesha's test:  Negative  Patella grind test:  Negative  Q-angle:  Normal  Apprehension Sign:  Negative        Imaging/Studies  Imaging Results (Last 7 Days)     Procedure Component Value Units Date/Time    XR Knee 3+ View With [place] Right [[id number]] Resulted:  01/29/20 1324     Updated:  01/29/20 1325    Narrative:       Knee X-Ray  Indication: Pain    Upright AP  Lateral,  and Sunrise views of right knee     Findings:  No fracture  No bony lesion  Normal soft tissues  Normal joint spaces    No prior  studies were available for comparison.          I viewed his knee MRI from January 23 which show some chondromalacia and bone marrow edema consistent with a bone bruise on the lateral femoral condyle there is no loose body and no meniscus tear  Assessment/Plan        ICD-10-CM ICD-9-CM   1. Right knee pain, unspecified chronicity M25.561 719.46   2. Chondromalacia of right knee M94.261 717.7       Orders Placed This Encounter   Procedures   • XR Knee 3+ View With [place] Right   • Ambulatory Referral to Physical Therapy   Ju Bhatti had messaged me about the patient.  He will go to physical therapy close to home.  Do not see any surgical indications on his MRI and we will see him back in 4 weeks.  If he fails to get better knee arthroscopy is an option but he should get better with physical therapy and time    Medical Decision Making  Management Options : over-the-counter medicine and physical/occupational therapy  Data/Risk: radiology tests, discussion with another health care provider and independent visualization of imaging, lab tests, or EMG/NCV    Vargas Douglas MD  01/29/20  1:37 PM         EMR Dragon/Transcription disclaimer:  Much of this encounter note is an electronic transcription of spoken language to printed text. Electronic transcription of spoken language may permit erroneous, or at times, nonsensical words or phrases to be inadvertently transcribed. Although I have reviewed the note for such errors, some may still exist.

## 2021-12-06 PROCEDURE — U0004 COV-19 TEST NON-CDC HGH THRU: HCPCS | Performed by: NURSE PRACTITIONER

## 2022-01-18 PROCEDURE — U0004 COV-19 TEST NON-CDC HGH THRU: HCPCS | Performed by: NURSE PRACTITIONER

## 2024-05-10 ENCOUNTER — PATIENT ROUNDING (BHMG ONLY) (OUTPATIENT)
Dept: URGENT CARE | Facility: CLINIC | Age: 34
End: 2024-05-10
Payer: COMMERCIAL

## 2025-05-09 ENCOUNTER — OFFICE VISIT (OUTPATIENT)
Dept: INTERNAL MEDICINE | Facility: CLINIC | Age: 35
End: 2025-05-09
Payer: COMMERCIAL

## 2025-05-09 VITALS
HEIGHT: 72 IN | SYSTOLIC BLOOD PRESSURE: 130 MMHG | OXYGEN SATURATION: 96 % | TEMPERATURE: 98 F | HEART RATE: 76 BPM | WEIGHT: 315 LBS | DIASTOLIC BLOOD PRESSURE: 70 MMHG | BODY MASS INDEX: 42.66 KG/M2

## 2025-05-09 DIAGNOSIS — E66.01 SEVERE OBESITY (BMI >= 40): ICD-10-CM

## 2025-05-09 DIAGNOSIS — J45.20 MILD INTERMITTENT ASTHMA WITHOUT COMPLICATION: ICD-10-CM

## 2025-05-09 DIAGNOSIS — G47.33 OSA ON CPAP: ICD-10-CM

## 2025-05-09 DIAGNOSIS — F41.9 ANXIETY: ICD-10-CM

## 2025-05-09 DIAGNOSIS — J30.89 ENVIRONMENTAL AND SEASONAL ALLERGIES: ICD-10-CM

## 2025-05-09 DIAGNOSIS — Z76.89 ENCOUNTER TO ESTABLISH CARE: Primary | ICD-10-CM

## 2025-05-09 DIAGNOSIS — K21.9 GASTROESOPHAGEAL REFLUX DISEASE WITHOUT ESOPHAGITIS: ICD-10-CM

## 2025-05-09 RX ORDER — LEVOCETIRIZINE DIHYDROCHLORIDE 5 MG/1
5 TABLET, FILM COATED ORAL EVERY EVENING
Qty: 90 TABLET | Refills: 3 | Status: SHIPPED | OUTPATIENT
Start: 2025-05-09

## 2025-05-09 RX ORDER — ESCITALOPRAM OXALATE 20 MG/1
20 TABLET ORAL DAILY
Qty: 90 TABLET | Refills: 3 | Status: SHIPPED | OUTPATIENT
Start: 2025-05-09

## 2025-05-09 RX ORDER — ESCITALOPRAM OXALATE 20 MG/1
1 TABLET ORAL DAILY
COMMUNITY
Start: 2025-04-30 | End: 2025-05-09 | Stop reason: SDUPTHER

## 2025-05-09 RX ORDER — OMEPRAZOLE 40 MG/1
40 CAPSULE, DELAYED RELEASE ORAL DAILY
Qty: 90 CAPSULE | Refills: 3 | Status: SHIPPED | OUTPATIENT
Start: 2025-05-09

## 2025-05-11 PROBLEM — K21.9 GASTROESOPHAGEAL REFLUX DISEASE WITHOUT ESOPHAGITIS: Status: ACTIVE | Noted: 2025-05-11

## 2025-05-11 PROBLEM — J45.20 MILD INTERMITTENT ASTHMA WITHOUT COMPLICATION: Status: ACTIVE | Noted: 2025-05-11

## 2025-05-11 PROBLEM — F41.9 ANXIETY: Status: ACTIVE | Noted: 2025-05-11

## 2025-05-11 PROBLEM — J30.89 ENVIRONMENTAL AND SEASONAL ALLERGIES: Status: ACTIVE | Noted: 2025-05-11

## 2025-05-11 PROBLEM — G47.33 OSA ON CPAP: Status: ACTIVE | Noted: 2025-05-11

## 2025-05-11 RX ORDER — SEMAGLUTIDE 0.25 MG/.5ML
0.25 INJECTION, SOLUTION SUBCUTANEOUS WEEKLY
Qty: 3 ML | Refills: 2 | Status: SHIPPED | OUTPATIENT
Start: 2025-05-11 | End: 2025-05-12 | Stop reason: SDUPTHER

## 2025-05-11 RX ORDER — SEMAGLUTIDE 0.25 MG/.5ML
0.25 INJECTION, SOLUTION SUBCUTANEOUS WEEKLY
Qty: 3 ML | Refills: 2 | Status: SHIPPED | OUTPATIENT
Start: 2025-05-11 | End: 2025-05-11

## 2025-05-12 DIAGNOSIS — E66.01 SEVERE OBESITY (BMI >= 40): ICD-10-CM

## 2025-05-12 RX ORDER — SEMAGLUTIDE 0.25 MG/.5ML
0.25 INJECTION, SOLUTION SUBCUTANEOUS WEEKLY
Qty: 3 ML | Refills: 2 | Status: SHIPPED | OUTPATIENT
Start: 2025-05-12

## 2025-05-12 NOTE — PROGRESS NOTES
Male Physical Note      Date: 2025   Patient Name: Kenny August  : 1990   MRN: [id number]     Chief Complaint:    Chief Complaint   Patient presents with    Establish [facility]     Weight loss- Discuss Zepbound or Wegovy       History of Present Illness: Kenny August is a 35 y.o. male who is here today to establish care.   History of Present Illness  His current clinic has ceased providing prior authorizations for weight loss medications, prompting him to seek a new healthcare provider. He experienced significant weight loss of 73 pounds over a 7-month period through fasting and daily walking but has since regained the weight due to persistent cravings. He has previously tried Saxenda, which was ineffective, and Wellbutrin, which exacerbated his irritability. He is interested in wegovy..    LAMBERT with CPAP use, estab with Edson     He has been taking Allegra-D daily for 14 years and has tried Claritin and Zyrtec without success. He has a history of sinus infections and underwent deviated septum surgery with adenoid reduction, which he believes worsened his allergies. He was prescribed Flonase but did not use it. He was diagnosed with asthma by Dr. Sandhu.    He has been taking omeprazole for several years  for GERD which is stable     Subjective      Review of Systems:   Review of Systems   Constitutional:  Positive for unexpected weight gain.   Allergic/Immunologic: Positive for environmental allergies.   All other systems reviewed and are negative.      Past Medical History, Social History, Family History and Care Team were all reviewed with patient and updated as appropriate.     Medications:     Current Outpatient Medications:     escitalopram (LEXAPRO) 20 MG tablet, Take 1 tablet by mouth Daily., Disp: 90 tablet, Rfl: 3    omeprazole (priLOSEC) 40 MG capsule, Take 1 capsule by mouth Daily., Disp: 90 capsule, Rfl: 3    Semaglutide-Weight Management (Wegovy) 0.25 MG/0.5ML solution auto-injector,  "Inject 0.5 mL under the skin into the appropriate area as directed 1 (One) Time Per Week., Disp: 3 mL, Rfl: 2    ibuprofen (ADVIL,MOTRIN) 800 MG tablet, 1 po q 8 h with food prn pain (Patient not taking: Reported on 5/9/2025), Disp: 30 tablet, Rfl: 0    levocetirizine (XYZAL) 5 MG tablet, Take 1 tablet by mouth Every Evening., Disp: 90 tablet, Rfl: 3    Allergies:   Allergies   Allergen Reactions    Amoxicillin-Pot Clavulanate Hives       Immunizations:  Immunization History   Administered Date(s) Administered    31-influenza Vac Quardvalent Preservativ 10/22/2018    COVID-19 (MODERNA) 1st,2nd,3rd Dose Monovalent 09/07/2021, 10/05/2021    Flu Vaccine Quad PF >36MO 09/11/2017    Flublok 18+yrs 10/22/2020    Fluzone (or Fluarix & Flulaval for VFC) >6mos 10/18/2021, 11/29/2022    Hepatitis A 10/25/2018, 04/30/2019    Influenza TIV (IM) 11/01/2016        Tobacco Use: Low Risk  (5/9/2025)    Patient History     Smoking Tobacco Use: Never     Smokeless Tobacco Use: Never     Passive Exposure: Not on file       Social History     Substance and Sexual Activity   Alcohol Use No        Social History     Substance and Sexual Activity   Drug Use No        PHQ-2 Depression Screening  Little interest or pleasure in doing things? Not at all   Feeling down, depressed, or hopeless? Not at all   PHQ-2 Total Score 0     Objective     Vital Signs:   Vitals:    05/09/25 1418   BP: 130/70   Pulse: 76   Temp: 98 °F (36.7 °C)   SpO2: 96%   Weight: (!) 148 kg (326 lb 12.8 oz)   Height: 182.9 cm (72\")     Body mass index is 44.32 kg/m².       Physical Exam  Vitals and nursing note reviewed.   Constitutional:       Appearance: Normal appearance. He is obese.   HENT:      Head: Normocephalic and atraumatic.      Right Ear: Tympanic membrane, ear canal and external ear normal.      Left Ear: Tympanic membrane, ear canal and external ear normal.      Nose: Nose normal.      Mouth/Throat:      Mouth: Mucous membranes are moist.      Pharynx: " Oropharynx is clear.   Eyes:      General: No scleral icterus.     Extraocular Movements: Extraocular movements intact.      Conjunctiva/sclera: Conjunctivae normal.      Pupils: Pupils are equal, round, and reactive to light.   Neck:      Thyroid: No thyromegaly.   Cardiovascular:      Rate and Rhythm: Normal rate and regular rhythm.      Heart sounds: Normal heart sounds.   Pulmonary:      Effort: Pulmonary effort is normal.      Breath sounds: Normal breath sounds.   Abdominal:      General: Bowel sounds are normal. There is no distension.      Palpations: Abdomen is soft.      Tenderness: There is no abdominal tenderness. There is no guarding.   Musculoskeletal:         General: Normal range of motion.      Cervical back: Normal range of motion and neck supple.      Right lower leg: No edema.      Left lower leg: No edema.   Lymphadenopathy:      Cervical: No cervical adenopathy.   Skin:     General: Skin is warm and dry.   Neurological:      General: No focal deficit present.      Mental Status: He is alert and oriented to person, place, and time.      Cranial Nerves: No cranial nerve deficit.      Sensory: No sensory deficit.      Motor: No weakness.      Coordination: Coordination normal.      Gait: Gait normal.   Psychiatric:         Mood and Affect: Mood normal.         Behavior: Behavior normal.         Assessment / Plan      Assessment/Plan:   Diagnoses and all orders for this visit:    1. Encounter to establish care (Primary)    2. Severe obesity (BMI >= 40)  -     Semaglutide-Weight Management (Wegovy) 0.25 MG/0.5ML solution auto-injector; Inject 0.5 mL under the skin into the appropriate area as directed 1 (One) Time Per Week.  Dispense: 3 mL; Refill: 2    3. Environmental and seasonal allergies  -     levocetirizine (XYZAL) 5 MG tablet; Take 1 tablet by mouth Every Evening.  Dispense: 90 tablet; Refill: 3    4. Mild intermittent asthma without complication    5. Gastroesophageal reflux disease without  esophagitis  -     omeprazole (priLOSEC) 40 MG capsule; Take 1 capsule by mouth Daily.  Dispense: 90 capsule; Refill: 3    6. Anxiety  -     escitalopram (LEXAPRO) 20 MG tablet; Take 1 tablet by mouth Daily.  Dispense: 90 tablet; Refill: 3    7. LAMBERT on CPAP    Plan:  Body mass index is 44.32 kg/m². Has comorbidities. No personal or family history of MTC, MEN, gastroparesis, pancreatitis. If gets approved, advised to take 1 subcutaneous injection in abdomen or thigh once per week and alterate sites. Recommend to eat in a calorie deficit, cardiovascular exercise 30 minutes 5 days per week and weight bearing exercises.  Discussed not taking Claritin D every day; trial of xyzal   Asthma stable, offered albuterol pt declines   GERD stable follow reflux measures/diet and exericse  Anxiety stable on lexapro; has not been on it long enough to know if effective yet just for the past 2-3 weeks   LAMBERT continue cpap use     Follow Up:   Return in about 6 months (around 11/9/2025) for Annual Physical.      MINNIE Whitfield  [facility]     Patient or patient representative verbalized consent for the use of Ambient Listening during the visit with MINNIE Whitfield for chart documentation.

## 2025-05-14 ENCOUNTER — PATIENT MESSAGE (OUTPATIENT)
Dept: INTERNAL MEDICINE | Facility: CLINIC | Age: 35
End: 2025-05-14
Payer: COMMERCIAL

## 2025-05-16 ENCOUNTER — PRIOR AUTHORIZATION (OUTPATIENT)
Dept: INTERNAL MEDICINE | Facility: CLINIC | Age: 35
End: 2025-05-16
Payer: COMMERCIAL

## 2025-06-23 DIAGNOSIS — E66.01 SEVERE OBESITY (BMI >= 40): Primary | ICD-10-CM

## 2025-06-23 RX ORDER — SEMAGLUTIDE 0.5 MG/.5ML
0.5 INJECTION, SOLUTION SUBCUTANEOUS WEEKLY
Qty: 2 ML | Refills: 2 | Status: SHIPPED | OUTPATIENT
Start: 2025-06-23

## 2025-06-24 ENCOUNTER — TELEMEDICINE (OUTPATIENT)
Dept: INTERNAL MEDICINE | Facility: CLINIC | Age: 35
End: 2025-06-24
Payer: COMMERCIAL

## 2025-06-24 DIAGNOSIS — R21 RASH: Primary | ICD-10-CM

## 2025-06-24 PROCEDURE — 99213 OFFICE O/P EST LOW 20 MIN: CPT | Performed by: NURSE PRACTITIONER

## 2025-06-24 RX ORDER — CLINDAMYCIN PHOSPHATE 11.9 MG/ML
SOLUTION TOPICAL 2 TIMES DAILY
Qty: 60 ML | Refills: 0 | Status: SHIPPED | OUTPATIENT
Start: 2025-06-24

## 2025-06-24 RX ORDER — TRIAMCINOLONE ACETONIDE 1 MG/G
1 CREAM TOPICAL 2 TIMES DAILY PRN
Qty: 80 G | Refills: 0 | Status: SHIPPED | OUTPATIENT
Start: 2025-06-24

## 2025-06-24 NOTE — PROGRESS NOTES
Patient has chosen to receive care through a telehealth visit.  Does the patient consent to use a video/audio device for their medical care today: Yes  The visit included audio and video interaction: Yes  Location of patient:  Location of Provider:office/clinic  Active Parties:  Kamilah Urias (APRN), Ibis Gross (BLAYNE), and patient, all in [facility]    Kenny August is a 35 y.o. male here for:  Chief Complaint   Patient presents with    Rash     Upper chest and back, images available in media section      History of Present Illness     Patient presents with rash on upper chest, back and moving up neck.  He was on doxycycline and went to the beach.  He did read that doxycycline could cause skin sensitivity so he wore sunscreen regularly.  He did note wearing a different type of sunscreen at 1 point.  He noticed little painful with things like something was biting him when he was in the pool which resolved.  Then once he got home from the beach he noticed these little fascicular spots on his chest and back.  They are not painful anymore, no itching at all.  Patient states if he did not see them he would not know that they were there.  No fever, myalgia, chills.    During today's visit, I reviewed the documented allergies, medications, chief complaint, and pertinent vitals.  I have confirmed with the patient that there have been no changes since this information was discussed with my clinical team member.    The following portions of the patient's history were reviewed and updated as appropriate: allergies, current medications, past family history, past medical history, past social history, past surgical history and problem list.    Review of Systems- rash     There were no vitals taken for this visit.      Objective   Nursing note reviewed.  General: healthy appearing, no distress.  HENT: no facial deformities, hearing is within normal limits   Eyes: EOM intact, no obvious nystagmus.  Neck: phonation normal.  No stridor  Pulm: Normal work of breathing  Neuro: A&O x 3. No abnormal movements.  Skin: No rashes appreciated to face. No visible cyanosis. Reviewed photos in media of patients rash.     Psych: Mood and affect appropriate. Insight normal. Judgement appropriate. Behavior normal. Memory normal.       Problem List Items Addressed This Visit    None  Visit Diagnoses         Rash    -  Primary    Relevant Medications    clindamycin (CLEOCIN T) 1 % external solution    triamcinolone (KENALOG) 0.1 % cream          Discussed this rash could be phototoxic reaction which would be drug-induced from the doxycycline, could also be allergic contact dermatitis from different sunscreen, heat rash, folliculitis.  Will treat with clindamycin which is an antibacterial due to inflamed follicles after showering and apply triamcinolone to the area to reduce inflammation.  He already completed the doxycycline.  Avoid further sun exposure until healed, use high SPF broad-spectrum sunscreen going forward.  And can use cool compresses or aloe gel for comfort.      Kamilah Urias, MINNIE

## 2025-07-17 DIAGNOSIS — E66.01 SEVERE OBESITY (BMI >= 40): ICD-10-CM

## 2025-07-17 RX ORDER — SEMAGLUTIDE 0.5 MG/.5ML
0.5 INJECTION, SOLUTION SUBCUTANEOUS WEEKLY
Qty: 2 ML | Refills: 2 | Status: SHIPPED | OUTPATIENT
Start: 2025-07-17